# Patient Record
Sex: FEMALE | Race: OTHER | NOT HISPANIC OR LATINO | URBAN - METROPOLITAN AREA
[De-identification: names, ages, dates, MRNs, and addresses within clinical notes are randomized per-mention and may not be internally consistent; named-entity substitution may affect disease eponyms.]

---

## 2019-08-09 ENCOUNTER — EMERGENCY (EMERGENCY)
Facility: HOSPITAL | Age: 79
LOS: 1 days | Discharge: HOME | End: 2019-08-09
Attending: EMERGENCY MEDICINE
Payer: SELF-PAY

## 2019-08-09 VITALS
OXYGEN SATURATION: 100 % | DIASTOLIC BLOOD PRESSURE: 77 MMHG | HEART RATE: 87 BPM | SYSTOLIC BLOOD PRESSURE: 149 MMHG | WEIGHT: 104.94 LBS | HEIGHT: 58 IN | RESPIRATION RATE: 20 BRPM | TEMPERATURE: 98 F

## 2019-08-09 VITALS
TEMPERATURE: 99 F | DIASTOLIC BLOOD PRESSURE: 72 MMHG | RESPIRATION RATE: 20 BRPM | HEART RATE: 79 BPM | SYSTOLIC BLOOD PRESSURE: 149 MMHG

## 2019-08-09 DIAGNOSIS — D64.9 ANEMIA, UNSPECIFIED: ICD-10-CM

## 2019-08-09 DIAGNOSIS — R06.02 SHORTNESS OF BREATH: ICD-10-CM

## 2019-08-09 LAB
ALBUMIN SERPL ELPH-MCNC: 4.5 G/DL — SIGNIFICANT CHANGE UP (ref 3.3–5.2)
ALP SERPL-CCNC: 83 U/L — SIGNIFICANT CHANGE UP (ref 40–120)
ALT FLD-CCNC: 24 U/L — SIGNIFICANT CHANGE UP
ANION GAP SERPL CALC-SCNC: 13 MMOL/L — SIGNIFICANT CHANGE UP (ref 5–17)
ANISOCYTOSIS BLD QL: SLIGHT — SIGNIFICANT CHANGE UP
APTT BLD: 34 SEC — SIGNIFICANT CHANGE UP (ref 27.5–36.3)
AST SERPL-CCNC: 23 U/L — SIGNIFICANT CHANGE UP
BASOPHILS # BLD AUTO: 0.16 K/UL — SIGNIFICANT CHANGE UP (ref 0–0.2)
BASOPHILS NFR BLD AUTO: 0.9 % — SIGNIFICANT CHANGE UP (ref 0–2)
BILIRUB SERPL-MCNC: 0.3 MG/DL — LOW (ref 0.4–2)
BLD GP AB SCN SERPL QL: SIGNIFICANT CHANGE UP
BUN SERPL-MCNC: 23 MG/DL — HIGH (ref 8–20)
CALCIUM SERPL-MCNC: 9.6 MG/DL — SIGNIFICANT CHANGE UP (ref 8.6–10.2)
CHLORIDE SERPL-SCNC: 100 MMOL/L — SIGNIFICANT CHANGE UP (ref 98–107)
CO2 SERPL-SCNC: 20 MMOL/L — LOW (ref 22–29)
CREAT SERPL-MCNC: 1.03 MG/DL — SIGNIFICANT CHANGE UP (ref 0.5–1.3)
DACRYOCYTES BLD QL SMEAR: SLIGHT — SIGNIFICANT CHANGE UP
ELLIPTOCYTES BLD QL SMEAR: SLIGHT — SIGNIFICANT CHANGE UP
EOSINOPHIL # BLD AUTO: 0 K/UL — SIGNIFICANT CHANGE UP (ref 0–0.5)
EOSINOPHIL NFR BLD AUTO: 0 % — SIGNIFICANT CHANGE UP (ref 0–6)
GIANT PLATELETS BLD QL SMEAR: PRESENT — SIGNIFICANT CHANGE UP
GLUCOSE SERPL-MCNC: 158 MG/DL — HIGH (ref 70–115)
HCT VFR BLD CALC: 26.7 % — LOW (ref 34.5–45)
HGB BLD-MCNC: 8.3 G/DL — LOW (ref 11.5–15.5)
HYPOCHROMIA BLD QL: SLIGHT — SIGNIFICANT CHANGE UP
INR BLD: 1.08 RATIO — SIGNIFICANT CHANGE UP (ref 0.88–1.16)
LYMPHOCYTES # BLD AUTO: 11.5 % — LOW (ref 13–44)
LYMPHOCYTES # BLD AUTO: 2.04 K/UL — SIGNIFICANT CHANGE UP (ref 1–3.3)
MACROCYTES BLD QL: SLIGHT — SIGNIFICANT CHANGE UP
MANUAL SMEAR VERIFICATION: SIGNIFICANT CHANGE UP
MCHC RBC-ENTMCNC: 31.1 GM/DL — LOW (ref 32–36)
MCHC RBC-ENTMCNC: 32 PG — SIGNIFICANT CHANGE UP (ref 27–34)
MCV RBC AUTO: 103.1 FL — HIGH (ref 80–100)
METAMYELOCYTES # FLD: 6.2 % — HIGH (ref 0–0)
MICROCYTES BLD QL: SLIGHT — SIGNIFICANT CHANGE UP
MONOCYTES # BLD AUTO: 0.16 K/UL — SIGNIFICANT CHANGE UP (ref 0–0.9)
MONOCYTES NFR BLD AUTO: 0.9 % — LOW (ref 2–14)
MYELOCYTES NFR BLD: 8.8 % — HIGH (ref 0–0)
NEUTROPHILS # BLD AUTO: 12.54 K/UL — HIGH (ref 1.8–7.4)
NEUTROPHILS NFR BLD AUTO: 63.7 % — SIGNIFICANT CHANGE UP (ref 43–77)
NEUTS BAND # BLD: 7.1 % — SIGNIFICANT CHANGE UP (ref 0–8)
NRBC # BLD: 2 /100 — HIGH (ref 0–0)
OVALOCYTES BLD QL SMEAR: SLIGHT — SIGNIFICANT CHANGE UP
PLAT MORPH BLD: ABNORMAL
PLATELET # BLD AUTO: 277 K/UL — SIGNIFICANT CHANGE UP (ref 150–400)
PLATELET COUNT - ESTIMATE: NORMAL — SIGNIFICANT CHANGE UP
POIKILOCYTOSIS BLD QL AUTO: SLIGHT — SIGNIFICANT CHANGE UP
POLYCHROMASIA BLD QL SMEAR: SLIGHT — SIGNIFICANT CHANGE UP
POTASSIUM SERPL-MCNC: 3.8 MMOL/L — SIGNIFICANT CHANGE UP (ref 3.5–5.3)
POTASSIUM SERPL-SCNC: 3.8 MMOL/L — SIGNIFICANT CHANGE UP (ref 3.5–5.3)
PROT SERPL-MCNC: 8 G/DL — SIGNIFICANT CHANGE UP (ref 6.6–8.7)
PROTHROM AB SERPL-ACNC: 12.4 SEC — SIGNIFICANT CHANGE UP (ref 10–12.9)
RBC # BLD: 2.59 M/UL — LOW (ref 3.8–5.2)
RBC # FLD: 16 % — HIGH (ref 10.3–14.5)
RBC BLD AUTO: ABNORMAL
SCHISTOCYTES BLD QL AUTO: SLIGHT — SIGNIFICANT CHANGE UP
SODIUM SERPL-SCNC: 133 MMOL/L — LOW (ref 135–145)
VARIANT LYMPHS # BLD: 0.9 % — SIGNIFICANT CHANGE UP (ref 0–6)
WBC # BLD: 17.71 K/UL — HIGH (ref 3.8–10.5)
WBC # FLD AUTO: 17.71 K/UL — HIGH (ref 3.8–10.5)

## 2019-08-09 PROCEDURE — 80053 COMPREHEN METABOLIC PANEL: CPT

## 2019-08-09 PROCEDURE — 85730 THROMBOPLASTIN TIME PARTIAL: CPT

## 2019-08-09 PROCEDURE — 85027 COMPLETE CBC AUTOMATED: CPT

## 2019-08-09 PROCEDURE — 86923 COMPATIBILITY TEST ELECTRIC: CPT

## 2019-08-09 PROCEDURE — G0378: CPT

## 2019-08-09 PROCEDURE — 36430 TRANSFUSION BLD/BLD COMPNT: CPT

## 2019-08-09 PROCEDURE — 99218: CPT

## 2019-08-09 PROCEDURE — 99285 EMERGENCY DEPT VISIT HI MDM: CPT | Mod: 25

## 2019-08-09 PROCEDURE — 86901 BLOOD TYPING SEROLOGIC RH(D): CPT

## 2019-08-09 PROCEDURE — 36415 COLL VENOUS BLD VENIPUNCTURE: CPT

## 2019-08-09 PROCEDURE — 86850 RBC ANTIBODY SCREEN: CPT

## 2019-08-09 PROCEDURE — P9016: CPT

## 2019-08-09 PROCEDURE — 86900 BLOOD TYPING SEROLOGIC ABO: CPT

## 2019-08-09 PROCEDURE — 85610 PROTHROMBIN TIME: CPT

## 2019-08-09 RX ORDER — METFORMIN HYDROCHLORIDE 850 MG/1
1 TABLET ORAL
Qty: 0 | Refills: 0 | DISCHARGE

## 2019-08-09 NOTE — ED CDU PROVIDER INITIAL DAY NOTE - PROGRESS NOTE DETAILS
PT with symptomatic improvement, due to hx of [place] overload will hold 2nd unite follow up to hematologist, pt agrees to plan of care.

## 2019-08-09 NOTE — ED CDU PROVIDER INITIAL DAY NOTE - DETAILS
PT with known dz with symptoms similar to other incidents pt with documentation of dz, placed in obs for transfusion due to symptomatic anemia.

## 2019-08-09 NOTE — ED STATDOCS - PROGRESS NOTE DETAILS
PA NOTE: Pt seen by intake physician and hpi/ROS/PE/plan reviewed and agreed with.    PE: GEN: Awake, alert,  NAD,  EYES: PERRL CARDIAC: Reg rate and rhythm, S1,S2, RRR  RESP: No distress noted. Lungs CTA bilaterally no wheeze, ronchi, rales. ABD: soft,  non-tender, no guarding. . NEURO: AOx3, no focal deficits   PLAN: PT found to be symptotically anemic, will place in obs for transfusion after discussing plan of care with pt who agrees to plan. PA NOTE: Pt seen by intake physician and hpi/ROS/PE/plan reviewed and agreed with.    PE: GEN: Awake, alert,  NAD,  EYES: PERRL CARDIAC: Reg rate and rhythm, S1,S2, RRR  RESP: No distress noted. Lungs CTA bilaterally no wheeze, ronchi, rales. ABD: soft,  non-tender, no guarding. . NEURO: AOx3, no focal deficits   PLAN: PT found to be symptotically anemic, appears clinically well last flew on june 3rd, stable VS pt wo CP, or actively SOB,  will place in obs for transfusion after discussing plan of care with pt who agrees to plan. PT documentation of dz from [id number] in [facility] scanned into alpha.

## 2019-08-09 NOTE — ED STATDOCS - CLINICAL SUMMARY MEDICAL DECISION MAKING FREE TEXT BOX
78F with SOB. No CP. Plan for lab work and transfuse. 78F with SOB and fatigue sent in for transfusion. No CP. Plan for lab work and transfuse.

## 2019-08-09 NOTE — ED STATDOCS - OBJECTIVE STATEMENT
77 y/o female with a PMHx of HTN, DM, myeloproliferative disorder presents to the ED sent to ED by PMD- Dr. Han for blood transfusion. Pt is here from [facility] visiting family, became SOB last week, worse with exertion. Labs from yesterday showed hemoglobin of 8.1. Pt with myeloproliferative disorder- requires blood transfusion every 3-6 months. Pt will be traveling back to [place] next week. Denies vaginal bleeding, hematuria, hematochezia, melena, CP, fever. Never smoker. 79 y/o female with a PMHx of HTN, DM, myeloproliferative disorder sent to ED by PMD- Dr. Han for blood transfusion. Pt is here from [facility] visiting family, became SOB last week, worse with exertion. +fatigue. Labs from yesterday showed hemoglobin of 8.1. Pt with myeloproliferative disorder- requires blood transfusion every 3-6 months. Pt will be traveling back to [place] next week. Denies vaginal bleeding, hematuria, hematochezia, melena, CP, fever. Never smoker.

## 2019-08-09 NOTE — ED CDU PROVIDER INITIAL DAY NOTE - OBJECTIVE STATEMENT
· 77 y/o female with a PMHx of HTN, DM, myeloproliferative  disease (records in Alpha for hematologist in [facility]) sent to ED by PMD- Dr. Han for blood transfusion. Pt is here from [facility] visiting family, (last flew june3rd) became SOB last week, worse with exertion. +fatigue. Labs from yesterday showed hemoglobin of 8.1. Pt with myeloproliferative disorder- requires blood transfusion every 3-6 months. Pt will be traveling back to [place] next week. Denies vaginal bleeding, hematuria, hematochezia, melena, CP, fever. Never smoker.

## 2019-08-09 NOTE — ED CDU PROVIDER INITIAL DAY NOTE - MEDICAL DECISION MAKING DETAILS
PT with symptomatic anemia placed in obs for Therapeutic intensity. PT seen BY OBS PA found to be appropriate CDU PT care transferred to PA.

## 2019-08-09 NOTE — ED CDU PROVIDER DISPOSITION NOTE - NS_EDPROVIDERDISPOUSERTYPE_ED_A_ED
Interventional Cardiology PA SDA Discharge Note    Patient without complaints.     Afebrile, VSS    Ext:    		Right           Groin:   No   hematoma,  No   bruit, dressing; C/D/I        Pulses:    intact DP/PT to baseline     A/P:      61 y/o F with PMHx HTN, HLD, DM, CAD s/p PCI at [facility] in 2015, who presented to her cardiologist c/o chest pain that comes on suddenly primarily at night when she is at rest, associated with sweating and nausea. She also reports increasing shortness of breath with minimal exertion. Of note, she recently lost her mother and reports high stress levels. EKG in MD office shows sinus rhythm with nonspecific ST changes.  In light of patient’s risk factors, CCS Class IV angina symptoms, and known CAD, patient warecommended for cardiac catheterization with possible intervention if clinically indicated. Underwent cardiac cath on 2/27/18 which revealed nonobstructive CAD (30% mLAD, patent stent RCA) with normal LV function, EF 60%, LVEDP 9, right groin vascade.             1.	Stable for discharge as per attending Dr. Bailey after bed rest, pt voids, groin stable and 30 minutes of ambulation.  2.	Follow-up with Cardiologist Dr. Perez in 1-2 weeks  3.	Discharged forms signed and copies in chart
Attending Attestation (For Attendings USE Only)...

## 2019-08-09 NOTE — ED CDU PROVIDER INITIAL DAY NOTE - ATTENDING CONTRIBUTION TO CARE
seen with acp: well appearing adult female traveling from [facility]; with DAMON reportedly due to symptomatic anemia; Patient has records from [facility], well documented history from hematologist describing myeloproliferative disease, need for frequent transfusions, and desire to keep Hb above 9; will admit to obs for transfusion 1 unit

## 2019-08-09 NOTE — ED STATDOCS - ATTENDING CONTRIBUTION TO CARE
Tierra: I performed a face to face bedside interview with patient regarding history of present illness, review of symptoms and past medical history. I completed an independent physical exam and ordered tests/medications as needed.  I have discussed patient's plan of care with advanced care provider. The advanced care provider assisted in  executing the discussed plan. I was available for any questions or issues that may have arose during the execution of the plan of care.

## 2019-08-09 NOTE — ED CDU PROVIDER DISPOSITION NOTE - CLINICAL COURSE
77 y/o female with a PMHx of HTN, DM, myeloproliferative  disease (records in Alpha for hematologist in [facility]) sent to ED by PMD- Dr. Han for blood transfusion. Pt is here from [facility] visiting family, (last flew june3rd) became SOB last week, worse with exertion. found to be anemic in PCP office confirmed in ED, PT with symptomatic improvement after one unit, due to hx of [place] overload will hold 2nd unite follow up to hematologist, pt agrees to plan of care.  Pt will be traveling back to [place] next week. educated about when to return to the ED if needed. PT verbalizes that he understands all instructions and results. Pt educated about the risks vs benefits of imaging at this time and agrees that not warranted for their symptoms, and PE

## 2019-08-09 NOTE — ED ADULT NURSE NOTE - OBJECTIVE STATEMENT
sent from daughter's PMD Dr. Lopez for hgb of 8.1. pt reports feeling increased weakness and tiredness as well as SOB x 1 week. visiting from [facility].

## 2019-08-09 NOTE — ED STATDOCS - PHYSICAL EXAMINATION
Gen: Well appearing in NAD  Head: NC/AT  Neck: trachea midline  Resp:  No distress, CTAB  CV: RRR  Ext: no deformities  Neuro:  A&O appears non focal  Skin:  Warm and dry as visualized  Psych:  Normal affect and mood

## 2020-09-21 NOTE — ED STATDOCS - RESPIRATORY [+], MLM
CC:  Moy Perez is here today for   Chief Complaint   Patient presents with   • Office Visit     Left wrist pain \"Woke one day last month and had pain wearing  brace all the time \"   • Wrist      .    Referring MD ER  PCP Antione Darby MD    Medications: medications verified and updated  Refills needed today?  NO  denies known Latex allergy or symptoms of Latex sensitivity.  Patient would like communication of their results via:      Cell Phone:   Telephone Information:   Mobile [phone]     Okay to leave a message containing results? Yes  Tobacco history: verified                 SHORTNESS OF BREATH

## 2021-07-09 NOTE — ED ADULT NURSE NOTE - CHPI ED NUR SYMPTOMS POS
Spoke with wife, Makenzie and states out to eat right now.  Call rescheduled for next week.    Bessy Landaverde RN, BSN, [facility]  Nurse Navigator  Phone: [phone]      
WEAKNESS/SHORTNESS OF BREATH

## 2022-08-22 ENCOUNTER — INPATIENT (INPATIENT)
Facility: HOSPITAL | Age: 82
LOS: 0 days | Discharge: HOME | DRG: 309 | End: 2022-08-23
Attending: HOSPITALIST | Admitting: INTERNAL MEDICINE
Payer: MEDICAID

## 2022-08-22 VITALS
TEMPERATURE: 98 F | OXYGEN SATURATION: 94 % | DIASTOLIC BLOOD PRESSURE: 76 MMHG | HEIGHT: 58 IN | SYSTOLIC BLOOD PRESSURE: 111 MMHG | HEART RATE: 187 BPM | RESPIRATION RATE: 20 BRPM | WEIGHT: 95.02 LBS

## 2022-08-22 DIAGNOSIS — E11.9 TYPE 2 DIABETES MELLITUS WITHOUT COMPLICATIONS: ICD-10-CM

## 2022-08-22 DIAGNOSIS — Z02.9 ENCOUNTER FOR ADMINISTRATIVE EXAMINATIONS, UNSPECIFIED: ICD-10-CM

## 2022-08-22 DIAGNOSIS — D47.1 CHRONIC MYELOPROLIFERATIVE DISEASE: ICD-10-CM

## 2022-08-22 DIAGNOSIS — I48.91 UNSPECIFIED ATRIAL FIBRILLATION: ICD-10-CM

## 2022-08-22 DIAGNOSIS — E87.1 HYPO-OSMOLALITY AND HYPONATREMIA: ICD-10-CM

## 2022-08-22 DIAGNOSIS — Z90.710 ACQUIRED ABSENCE OF BOTH CERVIX AND UTERUS: Chronic | ICD-10-CM

## 2022-08-22 DIAGNOSIS — I50.9 HEART FAILURE, UNSPECIFIED: ICD-10-CM

## 2022-08-22 DIAGNOSIS — E83.119 HEMOCHROMATOSIS, UNSPECIFIED: ICD-10-CM

## 2022-08-22 DIAGNOSIS — I10 ESSENTIAL (PRIMARY) HYPERTENSION: ICD-10-CM

## 2022-08-22 DIAGNOSIS — N18.30 CHRONIC KIDNEY DISEASE, STAGE 3 UNSPECIFIED: ICD-10-CM

## 2022-08-22 LAB
ALBUMIN SERPL ELPH-MCNC: 3.5 G/DL — SIGNIFICANT CHANGE UP (ref 3.3–5.2)
ALP SERPL-CCNC: 194 U/L — HIGH (ref 40–120)
ALT FLD-CCNC: 43 U/L — HIGH
ANION GAP SERPL CALC-SCNC: 13 MMOL/L — SIGNIFICANT CHANGE UP (ref 5–17)
ANISOCYTOSIS BLD QL: SLIGHT — SIGNIFICANT CHANGE UP
APPEARANCE UR: CLEAR — SIGNIFICANT CHANGE UP
APTT BLD: 35.6 SEC — HIGH (ref 27.5–35.5)
AST SERPL-CCNC: 44 U/L — HIGH
BACTERIA # UR AUTO: ABNORMAL
BASOPHILS # BLD AUTO: 0.45 K/UL — HIGH (ref 0–0.2)
BASOPHILS NFR BLD AUTO: 1.8 % — SIGNIFICANT CHANGE UP (ref 0–2)
BILIRUB SERPL-MCNC: 1.5 MG/DL — SIGNIFICANT CHANGE UP (ref 0.4–2)
BILIRUB UR-MCNC: NEGATIVE — SIGNIFICANT CHANGE UP
BUN SERPL-MCNC: 35.6 MG/DL — HIGH (ref 8–20)
CALCIUM SERPL-MCNC: 9.3 MG/DL — SIGNIFICANT CHANGE UP (ref 8.4–10.5)
CHLORIDE SERPL-SCNC: 88 MMOL/L — LOW (ref 98–107)
CO2 SERPL-SCNC: 24 MMOL/L — SIGNIFICANT CHANGE UP (ref 22–29)
COLOR SPEC: YELLOW — SIGNIFICANT CHANGE UP
CREAT SERPL-MCNC: 1.73 MG/DL — HIGH (ref 0.5–1.3)
DIFF PNL FLD: NEGATIVE — SIGNIFICANT CHANGE UP
EGFR: 29 ML/MIN/1.73M2 — LOW
EOSINOPHIL # BLD AUTO: 1.33 K/UL — HIGH (ref 0–0.5)
EOSINOPHIL NFR BLD AUTO: 5.3 % — SIGNIFICANT CHANGE UP (ref 0–6)
EPI CELLS # UR: SIGNIFICANT CHANGE UP
GLUCOSE SERPL-MCNC: 226 MG/DL — HIGH (ref 70–99)
GLUCOSE UR QL: NEGATIVE MG/DL — SIGNIFICANT CHANGE UP
HCT VFR BLD CALC: 32.9 % — LOW (ref 34.5–45)
HGB BLD-MCNC: 10.7 G/DL — LOW (ref 11.5–15.5)
INR BLD: 1.13 RATIO — SIGNIFICANT CHANGE UP (ref 0.88–1.16)
KETONES UR-MCNC: NEGATIVE — SIGNIFICANT CHANGE UP
LEUKOCYTE ESTERASE UR-ACNC: ABNORMAL
LYMPHOCYTES # BLD AUTO: 1.11 K/UL — SIGNIFICANT CHANGE UP (ref 1–3.3)
LYMPHOCYTES # BLD AUTO: 4.4 % — LOW (ref 13–44)
MACROCYTES BLD QL: SLIGHT — SIGNIFICANT CHANGE UP
MAGNESIUM SERPL-MCNC: 1.7 MG/DL — SIGNIFICANT CHANGE UP (ref 1.6–2.6)
MANUAL SMEAR VERIFICATION: SIGNIFICANT CHANGE UP
MCHC RBC-ENTMCNC: 32.5 GM/DL — SIGNIFICANT CHANGE UP (ref 32–36)
MCHC RBC-ENTMCNC: 32.8 PG — SIGNIFICANT CHANGE UP (ref 27–34)
MCV RBC AUTO: 100.9 FL — HIGH (ref 80–100)
MONOCYTES # BLD AUTO: 2.21 K/UL — HIGH (ref 0–0.9)
MONOCYTES NFR BLD AUTO: 8.8 % — SIGNIFICANT CHANGE UP (ref 2–14)
MYELOCYTES NFR BLD: 0.9 % — HIGH (ref 0–0)
NEUTROPHILS # BLD AUTO: 18.89 K/UL — HIGH (ref 1.8–7.4)
NEUTROPHILS NFR BLD AUTO: 75.2 % — SIGNIFICANT CHANGE UP (ref 43–77)
NITRITE UR-MCNC: NEGATIVE — SIGNIFICANT CHANGE UP
NT-PROBNP SERPL-SCNC: 3859 PG/ML — HIGH (ref 0–300)
PH UR: 6 — SIGNIFICANT CHANGE UP (ref 5–8)
PLAT MORPH BLD: NORMAL — SIGNIFICANT CHANGE UP
PLATELET # BLD AUTO: 176 K/UL — SIGNIFICANT CHANGE UP (ref 150–400)
POIKILOCYTOSIS BLD QL AUTO: SLIGHT — SIGNIFICANT CHANGE UP
POLYCHROMASIA BLD QL SMEAR: SIGNIFICANT CHANGE UP
POTASSIUM SERPL-MCNC: 4.1 MMOL/L — SIGNIFICANT CHANGE UP (ref 3.5–5.3)
POTASSIUM SERPL-SCNC: 4.1 MMOL/L — SIGNIFICANT CHANGE UP (ref 3.5–5.3)
PROMYELOCYTES # FLD: 2.7 % — HIGH (ref 0–0)
PROT SERPL-MCNC: 7.4 G/DL — SIGNIFICANT CHANGE UP (ref 6.6–8.7)
PROT UR-MCNC: 30 MG/DL
PROTHROM AB SERPL-ACNC: 13.1 SEC — SIGNIFICANT CHANGE UP (ref 10.5–13.4)
RAPID RVP RESULT: SIGNIFICANT CHANGE UP
RBC # BLD: 3.26 M/UL — LOW (ref 3.8–5.2)
RBC # FLD: 16.4 % — HIGH (ref 10.3–14.5)
RBC BLD AUTO: ABNORMAL
RBC CASTS # UR COMP ASSIST: SIGNIFICANT CHANGE UP /HPF (ref 0–4)
SARS-COV-2 RNA SPEC QL NAA+PROBE: SIGNIFICANT CHANGE UP
SCHISTOCYTES BLD QL AUTO: SLIGHT — SIGNIFICANT CHANGE UP
SMUDGE CELLS # BLD: PRESENT — SIGNIFICANT CHANGE UP
SODIUM SERPL-SCNC: 125 MMOL/L — LOW (ref 135–145)
SP GR SPEC: 1.01 — SIGNIFICANT CHANGE UP (ref 1.01–1.02)
TROPONIN T SERPL-MCNC: <0.01 NG/ML — SIGNIFICANT CHANGE UP (ref 0–0.06)
UROBILINOGEN FLD QL: NEGATIVE MG/DL — SIGNIFICANT CHANGE UP
VARIANT LYMPHS # BLD: 0.9 % — SIGNIFICANT CHANGE UP (ref 0–6)
WBC # BLD: 25.12 K/UL — HIGH (ref 3.8–10.5)
WBC # FLD AUTO: 25.12 K/UL — HIGH (ref 3.8–10.5)
WBC UR QL: SIGNIFICANT CHANGE UP /HPF (ref 0–5)

## 2022-08-22 PROCEDURE — 93306 TTE W/DOPPLER COMPLETE: CPT | Mod: 26

## 2022-08-22 PROCEDURE — 99223 1ST HOSP IP/OBS HIGH 75: CPT

## 2022-08-22 PROCEDURE — 99285 EMERGENCY DEPT VISIT HI MDM: CPT

## 2022-08-22 PROCEDURE — 71045 X-RAY EXAM CHEST 1 VIEW: CPT | Mod: 26

## 2022-08-22 PROCEDURE — 93010 ELECTROCARDIOGRAM REPORT: CPT | Mod: 76

## 2022-08-22 RX ORDER — DEFERASIROX 180 MG/1
960 GRANULE ORAL
Qty: 0 | Refills: 0 | DISCHARGE

## 2022-08-22 RX ORDER — SODIUM CHLORIDE 9 MG/ML
250 INJECTION INTRAMUSCULAR; INTRAVENOUS; SUBCUTANEOUS ONCE
Refills: 0 | Status: COMPLETED | OUTPATIENT
Start: 2022-08-22 | End: 2022-08-22

## 2022-08-22 RX ORDER — FOLIC ACID 0.8 MG
1 TABLET ORAL
Qty: 0 | Refills: 0 | DISCHARGE

## 2022-08-22 RX ORDER — ASPIRIN/CALCIUM CARB/MAGNESIUM 324 MG
100 TABLET ORAL
Qty: 0 | Refills: 0 | DISCHARGE

## 2022-08-22 RX ORDER — HEPARIN SODIUM 5000 [USP'U]/ML
INJECTION INTRAVENOUS; SUBCUTANEOUS
Qty: 25000 | Refills: 0 | Status: DISCONTINUED | OUTPATIENT
Start: 2022-08-22 | End: 2022-08-22

## 2022-08-22 RX ORDER — ATORVASTATIN CALCIUM 80 MG/1
40 TABLET, FILM COATED ORAL AT BEDTIME
Refills: 0 | Status: DISCONTINUED | OUTPATIENT
Start: 2022-08-22 | End: 2022-08-23

## 2022-08-22 RX ORDER — HEPARIN SODIUM 5000 [USP'U]/ML
1500 INJECTION INTRAVENOUS; SUBCUTANEOUS EVERY 6 HOURS
Refills: 0 | Status: DISCONTINUED | OUTPATIENT
Start: 2022-08-22 | End: 2022-08-22

## 2022-08-22 RX ORDER — OMEGA-3 ACID ETHYL ESTERS 1 G
1 CAPSULE ORAL
Qty: 0 | Refills: 0 | DISCHARGE

## 2022-08-22 RX ORDER — ALLOPURINOL 300 MG
1 TABLET ORAL
Qty: 0 | Refills: 0 | DISCHARGE

## 2022-08-22 RX ORDER — ANAGRELIDE HCL 0.5 MG
0.5 CAPSULE ORAL
Qty: 0 | Refills: 0 | DISCHARGE

## 2022-08-22 RX ORDER — SODIUM CHLORIDE 9 MG/ML
1 INJECTION INTRAMUSCULAR; INTRAVENOUS; SUBCUTANEOUS
Refills: 0 | Status: DISCONTINUED | OUTPATIENT
Start: 2022-08-22 | End: 2022-08-23

## 2022-08-22 RX ORDER — LANOLIN ALCOHOL/MO/W.PET/CERES
3 CREAM (GRAM) TOPICAL AT BEDTIME
Refills: 0 | Status: DISCONTINUED | OUTPATIENT
Start: 2022-08-22 | End: 2022-08-23

## 2022-08-22 RX ORDER — SODIUM CHLORIDE 9 MG/ML
1 INJECTION INTRAMUSCULAR; INTRAVENOUS; SUBCUTANEOUS
Qty: 0 | Refills: 0 | DISCHARGE

## 2022-08-22 RX ORDER — ROSUVASTATIN CALCIUM 5 MG/1
1 TABLET ORAL
Qty: 0 | Refills: 0 | DISCHARGE

## 2022-08-22 RX ORDER — DILTIAZEM HCL 120 MG
10 CAPSULE, EXT RELEASE 24 HR ORAL ONCE
Refills: 0 | Status: COMPLETED | OUTPATIENT
Start: 2022-08-22 | End: 2022-08-22

## 2022-08-22 RX ORDER — DILTIAZEM HCL 120 MG
30 CAPSULE, EXT RELEASE 24 HR ORAL ONCE
Refills: 0 | Status: COMPLETED | OUTPATIENT
Start: 2022-08-22 | End: 2022-08-22

## 2022-08-22 RX ORDER — LOSARTAN POTASSIUM 100 MG/1
100 TABLET, FILM COATED ORAL DAILY
Refills: 0 | Status: DISCONTINUED | OUTPATIENT
Start: 2022-08-22 | End: 2022-08-23

## 2022-08-22 RX ORDER — AMLODIPINE BESYLATE 2.5 MG/1
1 TABLET ORAL
Qty: 0 | Refills: 0 | DISCHARGE

## 2022-08-22 RX ORDER — SODIUM CHLORIDE 9 MG/ML
500 INJECTION, SOLUTION INTRAVENOUS ONCE
Refills: 0 | Status: COMPLETED | OUTPATIENT
Start: 2022-08-22 | End: 2022-08-22

## 2022-08-22 RX ORDER — ALLOPURINOL 300 MG
100 TABLET ORAL DAILY
Refills: 0 | Status: DISCONTINUED | OUTPATIENT
Start: 2022-08-22 | End: 2022-08-23

## 2022-08-22 RX ORDER — APIXABAN 2.5 MG/1
2.5 TABLET, FILM COATED ORAL
Refills: 0 | Status: DISCONTINUED | OUTPATIENT
Start: 2022-08-22 | End: 2022-08-23

## 2022-08-22 RX ORDER — ACETAMINOPHEN 500 MG
650 TABLET ORAL EVERY 6 HOURS
Refills: 0 | Status: DISCONTINUED | OUTPATIENT
Start: 2022-08-22 | End: 2022-08-23

## 2022-08-22 RX ORDER — HEPARIN SODIUM 5000 [USP'U]/ML
3500 INJECTION INTRAVENOUS; SUBCUTANEOUS EVERY 6 HOURS
Refills: 0 | Status: DISCONTINUED | OUTPATIENT
Start: 2022-08-22 | End: 2022-08-22

## 2022-08-22 RX ORDER — HEPARIN SODIUM 5000 [USP'U]/ML
3500 INJECTION INTRAVENOUS; SUBCUTANEOUS ONCE
Refills: 0 | Status: COMPLETED | OUTPATIENT
Start: 2022-08-22 | End: 2022-08-22

## 2022-08-22 RX ORDER — FOLIC ACID 0.8 MG
1 TABLET ORAL DAILY
Refills: 0 | Status: DISCONTINUED | OUTPATIENT
Start: 2022-08-22 | End: 2022-08-23

## 2022-08-22 RX ORDER — OMEPRAZOLE 10 MG/1
1 CAPSULE, DELAYED RELEASE ORAL
Qty: 0 | Refills: 0 | DISCHARGE

## 2022-08-22 RX ORDER — FUROSEMIDE 40 MG
1 TABLET ORAL
Qty: 0 | Refills: 0 | DISCHARGE

## 2022-08-22 RX ORDER — METOPROLOL TARTRATE 50 MG
25 TABLET ORAL
Refills: 0 | Status: DISCONTINUED | OUTPATIENT
Start: 2022-08-22 | End: 2022-08-23

## 2022-08-22 RX ADMIN — SODIUM CHLORIDE 250 MILLILITER(S): 9 INJECTION INTRAMUSCULAR; INTRAVENOUS; SUBCUTANEOUS at 05:53

## 2022-08-22 RX ADMIN — Medication 100 MILLIGRAM(S): at 17:57

## 2022-08-22 RX ADMIN — HEPARIN SODIUM 3500 UNIT(S): 5000 INJECTION INTRAVENOUS; SUBCUTANEOUS at 06:40

## 2022-08-22 RX ADMIN — Medication 10 MILLIGRAM(S): at 05:39

## 2022-08-22 RX ADMIN — Medication 30 MILLIGRAM(S): at 05:47

## 2022-08-22 RX ADMIN — SODIUM CHLORIDE 1 GRAM(S): 9 INJECTION INTRAMUSCULAR; INTRAVENOUS; SUBCUTANEOUS at 17:57

## 2022-08-22 RX ADMIN — HEPARIN SODIUM 800 UNIT(S)/HR: 5000 INJECTION INTRAVENOUS; SUBCUTANEOUS at 06:37

## 2022-08-22 RX ADMIN — APIXABAN 2.5 MILLIGRAM(S): 2.5 TABLET, FILM COATED ORAL at 17:57

## 2022-08-22 RX ADMIN — Medication 1 MILLIGRAM(S): at 17:57

## 2022-08-22 RX ADMIN — Medication 25 MILLIGRAM(S): at 17:57

## 2022-08-22 RX ADMIN — SODIUM CHLORIDE 500 MILLILITER(S): 9 INJECTION, SOLUTION INTRAVENOUS at 09:08

## 2022-08-22 NOTE — H&P ADULT - PROBLEM SELECTOR PLAN 6
As per daughter current CBC including WBC is at baseline.  With this information low concern for underlying infection.

## 2022-08-22 NOTE — H&P ADULT - PROBLEM SELECTOR PLAN 4
As per daughter current sodium is at baseline, Will continue salt tablets. As per daughter current sodium is at baseline, Will continue salt tablets.  Hold Lasix as patient seems volume depleted.

## 2022-08-22 NOTE — CONSULT NOTE ADULT - SUBJECTIVE AND OBJECTIVE BOX
[facility]                                              CARDIOLOGY AT [place] [address]                                             Telephone: [phone]. Fax:[phone]                                                       CARDIOLOGY CONSULTATION NOTE                                                                                             History obtained by: Patient and medical record  Community Cardiologist: None   obtained: Yes [  ] No [  ]  Reason for Consultation: Palpitations  Available out pt records reviewed: Yes [X] No [  ]    Chief complaint:  I have fast heart beat     HPI: Patient is an 80yo F w/ PMHx of MDS, Non ischemic CM,      CARDIAC TESTING   ECHO: Pen    STRESS:    CATH:     ELECTROPHYSIOLOGY:     PAST MEDICAL HISTORY  HTN (hypertension)  DM (diabetes mellitus)  Myeloproliferative disorder    PAST SURGICAL HISTORY    SOCIAL HISTORY:  Denies smoking/alcohol/drugs    FAMILY HISTORY: None reported    Family History of Cardiovascular Disease:  Yes [X] No [  ]  Coronary Artery Disease in first degree relative: Yes [  ] No [  ]  Sudden Cardiac Death in First degree relative: Yes [  ] No [  ]    HOME MEDICATIONS:  anagrelide 0.5 mg oral capsule: 0.5 milligram(s) orally 3 times a day (09 Aug 2019 08:22)  aspirin 162 mg oral delayed release tablet: 100 milligram(s) orally once a day (09 Aug 2019 08:22)  Fish Oil 1000 mg oral capsule: 1 cap(s) orally once a day (09 Aug 2019 08:22)  metFORMIN 500 mg oral tablet, extended release: 1 tab(s) orally once a day (09 Aug 2019 08:22)  Norvasc 5 mg oral tablet: 1 tab(s) orally once a day (09 Aug 2019 08:22)  omeprazole 40 mg oral delayed release capsule: 1 cap(s) orally once a day (09 Aug 2019 08:22)    CURRENT CARDIAC MEDICATIONS:    CURRENT OTHER MEDICATIONS:  heparin   Injectable 3500 Unit(s) IV Push once, Stop order after: 1 Doses  heparin   Injectable 3500 Unit(s) IV Push every 6 hours PRN For aPTT less than 40  heparin   Injectable 1500 Unit(s) IV Push every 6 hours PRN For aPTT between 40 - 57  heparin  Infusion.  Unit(s)/Hr (8 mL/Hr) IV Continuous <Continuous>    ALLERGIES: Bactrim (Urticaria)    REVIEW OF SYMPTOMS:   CONSTITUTIONAL: No fever, no chills, no weight loss, no weight gain, no fatigue   ENMT:  No vertigo; No sinus or throat pain  NECK: No pain or stiffness  CARDIOVASCULAR: + chest pain, + dyspnea, no syncope/presyncope, + palpitations, no dizziness, no Orthopnea, no Paroxsymal nocturnal dyspnea  RESPIRATORY: no Shortness of breath, no cough, no wheezing  : No dysuria, no hematuria   GI: No dark color stool, no nausea, no diarrhea, no constipation, no abdominal pain   NEURO: No headache, no slurred speech   MUSCULOSKELETAL: No joint pain or swelling; No muscle, back, or extremity pain  PSYCH: No agitation, no anxiety    ALL OTHER REVIEW OF SYSTEMS ARE NEGATIVE.    VITAL SIGNS:  T(C): 36.7 (08-22-22 @ 04:49), Max: 36.7 (08-22-22 @ 04:49)  T(F): 98.1 (08-22-22 @ 04:49), Max: 98.1 (08-22-22 @ 04:49)  HR: 121 (08-22-22 @ 05:48) (121 - 187)  BP: 96/53 (08-22-22 @ 05:48) (96/53 - 116/59)  RR: 18 (08-22-22 @ 05:48) (18 - 20)  SpO2: 94% (08-22-22 @ 05:48) (94% - 94%)    INTAKE AND OUTPUT:     PHYSICAL EXAM:  Constitutional: Comfortable, no acute distress   HEENT: Atraumatic and normocephalic, neck is supple, no JVD  CNS: A&Ox3, No focal deficits   Respiratory: + fine crackles at the bases B/L, no wheezing, unlabored   Cardiovascular: + tachycardic, Irreg/irreg, S1S2, No murmur  Gastrointestinal: Soft, non-tender +Bowel sounds.   Extremities: 2+ Peripheral Pulses, No clubbing, cyanosis, or edema  Psychiatric: Calm, no agitation   Skin: Warm and dry, no ulcers on extremities     LABS: Pen    INTERPRETATION OF TELEMETRY: A fib RVR  ECG: A fib w/ RVR non ischemic   Prior ECG: Yes [X] No [  ]    RADIOLOGY & ADDITIONAL STUDIES:   X-ray:  Pen                                              [facility]                                              CARDIOLOGY AT [address], [place] [address]                                             Telephone: [phone]. Fax:[phone]                                                       CARDIOLOGY CONSULTATION NOTE                                                                                             History obtained by: Patient and medical record  Community Cardiologist: None   obtained: Yes [  ] No [X]  Reason for Consultation: Palpitations  Available out pt records reviewed: Yes [X] No [  ]    Chief complaint:  I have fast heart beat     HPI: Patient is an 80yo F w/ PMHx of MDS, Non ischemic CM, HTN, T2DM, presenting with palpitations that started suddenly yesterday.  Patient had some atypical chest pressure and dyspnea prior to her palpitations and daughter took her to Urgent Care for evaluation.  No acute findings at the time and ECG w/ NSR on 8/21.  This morning patient's daughter checked mom's pulse and it was rapid with irregular rhythm.  In the EDU HR in 150-170 and irregular with no history of prior A-fib reported.  Patient hemodynamically stable and denies CP, fever, chills, N/V, headache, cough, diarrhea, hematuria, dysuria, dark stools, focal neurologic symptoms, slurred speech or leg weakness.    CARDIAC TESTING   ECHO: Pen  STRESS:  CATH:   ELECTROPHYSIOLOGY:     PAST MEDICAL HISTORY  HTN (hypertension)  DM (diabetes mellitus)  Myeloproliferative disorder    PAST SURGICAL HISTORY    SOCIAL HISTORY:  Denies smoking/alcohol/drugs    FAMILY HISTORY: None reported    Family History of Cardiovascular Disease:  Yes [X] No [  ]  Coronary Artery Disease in first degree relative: Yes [  ] No [  ]  Sudden Cardiac Death in First degree relative: Yes [  ] No [  ]    HOME MEDICATIONS:  anagrelide 0.5 mg oral capsule: 0.5 milligram(s) orally 3 times a day (09 Aug 2019 08:22)  aspirin 162 mg oral delayed release tablet: 100 milligram(s) orally once a day (09 Aug 2019 08:22)  Fish Oil 1000 mg oral capsule: 1 cap(s) orally once a day (09 Aug 2019 08:22)  metFORMIN 500 mg oral tablet, extended release: 1 tab(s) orally once a day (09 Aug 2019 08:22)  Norvasc 5 mg oral tablet: 1 tab(s) orally once a day (09 Aug 2019 08:22)  omeprazole 40 mg oral delayed release capsule: 1 cap(s) orally once a day (09 Aug 2019 08:22)    CURRENT CARDIAC MEDICATIONS:    CURRENT OTHER MEDICATIONS:  heparin   Injectable 3500 Unit(s) IV Push once, Stop order after: 1 Doses  heparin   Injectable 3500 Unit(s) IV Push every 6 hours PRN For aPTT less than 40  heparin   Injectable 1500 Unit(s) IV Push every 6 hours PRN For aPTT between 40 - 57  heparin  Infusion.  Unit(s)/Hr (8 mL/Hr) IV Continuous <Continuous>    ALLERGIES: Bactrim (Urticaria)    REVIEW OF SYMPTOMS:   CONSTITUTIONAL: No fever, no chills, no weight loss, no weight gain, no fatigue   ENMT:  No vertigo; No sinus or throat pain  NECK: No pain or stiffness  CARDIOVASCULAR: + chest pain, + dyspnea, no syncope/presyncope, + palpitations, no dizziness, no Orthopnea, no Paroxsymal nocturnal dyspnea  RESPIRATORY: no Shortness of breath, no cough, no wheezing  : No dysuria, no hematuria   GI: No dark color stool, no nausea, no diarrhea, no constipation, no abdominal pain   NEURO: No headache, no slurred speech   MUSCULOSKELETAL: No joint pain or swelling; No muscle, back, or extremity pain  PSYCH: No agitation, no anxiety    ALL OTHER REVIEW OF SYSTEMS ARE NEGATIVE.    VITAL SIGNS:  T(C): 36.7 (08-22-22 @ 04:49), Max: 36.7 (08-22-22 @ 04:49)  T(F): 98.1 (08-22-22 @ 04:49), Max: 98.1 (08-22-22 @ 04:49)  HR: 121 (08-22-22 @ 05:48) (121 - 187)  BP: 96/53 (08-22-22 @ 05:48) (96/53 - 116/59)  RR: 18 (08-22-22 @ 05:48) (18 - 20)  SpO2: 94% (08-22-22 @ 05:48) (94% - 94%)    INTAKE AND OUTPUT:     PHYSICAL EXAM:  Constitutional: Comfortable, no acute distress   HEENT: Atraumatic and normocephalic, neck is supple, no JVD  CNS: A&Ox3, No focal deficits   Respiratory: + fine crackles at the bases B/L, no wheezing, unlabored   Cardiovascular: + tachycardic, Irreg/irreg, S1S2, No murmur  Gastrointestinal: Soft, non-tender +Bowel sounds.   Extremities: 2+ Peripheral Pulses, No clubbing, cyanosis, or edema  Psychiatric: Calm, no agitation   Skin: Warm and dry, no ulcers on extremities     LABS: Pen    INTERPRETATION OF TELEMETRY: A fib RVR  ECG: A fib w/ RVR non ischemic   Prior ECG: Yes [X] No [  ]    RADIOLOGY & ADDITIONAL STUDIES:   X-ray:  Pen

## 2022-08-22 NOTE — H&P ADULT - VTE RISK ASSESSMENT
Shayla Rogers is a 85 year old female presenting for   Chief Complaint   Patient presents with   • Office Visit   • Neurologic Problem     Lacunar infarct, acute     Denies Latex allergy or sensitivity.      Pt is here for a consultation from Dr. Barber for Lacunar Infarct, acute. Consult with Dr. Rodriguez in ED on 12/26/2020.    Pt presents with daughter, Jocelyn. Pt ambulates minimally d/t injury. Pt is currently in wheel chair. Patient currently working with PT. Completed ST. Pt resides with daughter.     Schedule with Cardio on 4/2/2021.    Current Meds:  ASA 81 mg QD  Lipitor 80 mg QD  Celexa 10 mg QD      Images:  Us Carotid 12/27/2020  MRI Brain 12/27/2020  CT Head 12/26/2020  Cardiac Monitor 1/28/2021           <<--- Click to launch

## 2022-08-22 NOTE — H&P ADULT - HISTORY OF PRESENT ILLNESS
81 yr old F w/a PMH of MDS, Hemochromatosis, Non ischemic CM, HTN, gout, DM presents new onset of Afib.  Patient is visiting from [place] since May.  Today patient had a flight to go back when the daughter (nurse) took her vital signs and saw that the patient with tachycardiac to 170.  Overall patient states she has been feeling in normal health, just does describe an increase in fatigue.  A few weeks ago they did go to the urgent care for chest pain and the EKG at that time was negative.

## 2022-08-22 NOTE — ED PROVIDER NOTE - OBJECTIVE STATEMENT
81y Female with history of CHF, HTN, DM, myeloproliferative disorder presenting with palpitations with no chest pain, shortness of breath, nausea, abdominal pain. Patient reports chest pain yesterday. Denies history of afib. No recent med change. Denies fevers, chills, headache, cough, nausea, vomiting, diarrhea, hematuria, dysuria, dark stools, focal neurologic symptoms.

## 2022-08-22 NOTE — H&P ADULT - NEUROLOGICAL
Face to face report given with opportunity to observe patient.    Report given to Linn Casiano RN   10/19/2020  11:09 PM       negative normal/cranial nerves II-XII intact/sensation intact

## 2022-08-22 NOTE — CONSULT NOTE ADULT - PROBLEM SELECTOR RECOMMENDATION 2
Resume home Norvasc for now, patient normotensive and asymptomatic  - low Na diet, daily minimal exercise encouraged, weight management  - VS as per unit protocol  - TTE in AM

## 2022-08-22 NOTE — H&P ADULT - NSHPLABSRESULTS_GEN_ALL_CORE
LABS:                         10.7   25.12 )-----------( 176      ( 22 Aug 2022 05:18 )             32.9         125<L>  |  88<L>  |  35.6<H>  ----------------------------<  226<H>  4.1   |  24.0  |  1.73<H>    Ca    9.3      22 Aug 2022 05:18  Mg     1.7         TPro  7.4  /  Alb  3.5  /  TBili  1.5  /  DBili  x   /  AST  44<H>  /  ALT  43<H>  /  AlkPhos  194<H>      PT/INR - ( 22 Aug 2022 05:18 )   PT: 13.1 sec;   INR: 1.13 ratio         PTT - ( 22 Aug 2022 05:18 )  PTT:35.6 sec  Urinalysis Basic - ( 22 Aug 2022 08:46 )    Color: Yellow / Appearance: Clear / S.010 / pH: x  Gluc: x / Ketone: Negative  / Bili: Negative / Urobili: Negative mg/dL   Blood: x / Protein: 30 mg/dL / Nitrite: Negative   Leuk Esterase: Small / RBC: 0-2 /HPF / WBC 0-2 /HPF   Sq Epi: x / Non Sq Epi: Occasional / Bacteria: Occasional      CARDIAC MARKERS ( 22 Aug 2022 05:18 )  x     / <0.01 ng/mL / x     / x     / x          Serum Pro-Brain Natriuretic Peptide: 3859 pg/mL ( @ 05:18)      Records reviewed from prior hospitalization.  Labs reviewed remarkable for   EKG personally reviewed   CXR personally reviewed

## 2022-08-22 NOTE — ED ADULT NURSE NOTE - NSIMPLEMENTINTERV_GEN_ALL_ED
Implemented All Universal Safety Interventions:  [place] to call system. Call bell, personal items and telephone within reach. Instruct patient to call for assistance. Room bathroom lighting operational. Non-slip footwear when patient is off stretcher. Physically safe environment: no spills, clutter or unnecessary equipment. Stretcher in lowest position, wheels locked, appropriate side rails in place.

## 2022-08-22 NOTE — H&P ADULT - PROBLEM SELECTOR PLAN 3
As above echocardiogram pending,  As per outside echocardiogram shows infiltrative disease with iron deposits

## 2022-08-22 NOTE — ED ADULT NURSE NOTE - OBJECTIVE STATEMENT
Pt is an 81YOF who is here for a fast heart rate with palpitations, pt was awake and attempting to get ready for her flight, pts daughter julio states that she felt her pulse and it was fast, so they came to the Emergency Department, pt states she is not having labored breathing, but she says she feels like at times she finds it difficult to breathe, pt has no retractions, no accessory muscle use, pt is a&o4, pt has crackles bilateral lower lobes, denies any fevers, chills, sick contacts, nausea, vomiting. Denies any chest pain, cough, dizziness.

## 2022-08-22 NOTE — H&P ADULT - PROBLEM SELECTOR PLAN 1
Patient with episode of rapid afib which broke spontaneously likely exacerbated from underlying infiltrative cardiomyopathy from Hemochromatosis.      Echocardiogram pending  Start Eliquis BID patient to go to [facility] office to get 30 day supply no insurance as from [place]  Metoprolol 25 BID   Monitor on tele for 24 hours if no events dc in AM

## 2022-08-22 NOTE — CONSULT NOTE ADULT - PROBLEM SELECTOR RECOMMENDATION 9
Atrial fib, patient asymptomatic with rapid ventricular response  - [id number]= 4  - start Heparin gtt with bolus if no contraindications, and follow with Heparin continuos infusion, keep PTT 50-80sec, monitor daily CBC and Plt count  - Tele monitor, check CBC, CMP, TFTs, trend CE x3, ECG and CXR  - will transition to NOAC before discharge once non valvular and renal etiologies ruled out  - start Cardizem 30mg oral every 6 hours for rate control and Lopressor 5mg IVP every 6 hours as needed for HR>120  - awaiting TTE in AM  - AF is a risk factor for cognitive impairment and dementia and mortality. Physical activity and higher cardiorespiratory fitness may protect against mortality in AF.

## 2022-08-22 NOTE — ED PROVIDER NOTE - WET READ LAUNCH FT
There are no Wet Read(s) to document. There is 1 Wet Read(s) to document. Adjacent Tissue Transfer Text: The defect edges were debeveled with a #15 scalpel blade.  Given the location of the defect and the proximity to free margins an adjacent tissue transfer was deemed most appropriate.  Using a sterile surgical marker, an appropriate flap was drawn incorporating the defect and placing the expected incisions within the relaxed skin tension lines where possible.    The area thus outlined was incised deep to adipose tissue with a #15 scalpel blade.  The skin margins were undermined to an appropriate distance in all directions utilizing iris scissors.

## 2022-08-22 NOTE — CONSULT NOTE ADULT - NS ATTEND AMEND GEN_ALL_CORE FT
Chronic low sodium. atrial fibrillation with rapid ventricular rate .    Prior history of diastolic dyfucntiona dn hemochrromatosis of the heart.  she is not on phelbotomy as she also has MDS and anemia.   she is from [facility] and has a phsyicians in [facility]. atrial fibrillation is new thought  will get echo.   and now that she is back on sinus. and since sodium although low is at her baseline and she is on sodium tablets.   after echo , and if no significant red flags, she could be discharge home with sandra mcbride with her physicians.    we will provide her with anyi on dicharge  samples form our office so that she can go to [facility] and  her meds.  she is ON ARb. and we will starte her on beta-blocker

## 2022-08-22 NOTE — ED ADULT NURSE REASSESSMENT NOTE - NS ED NURSE REASSESS COMMENT FT1
assumed care of pt from night rn, pt came in for fast heart beat, pt denies palpitations or chest pain at this time, neg. SOB, pt on heparin drip on pump, pt on the monitor,

## 2022-08-22 NOTE — ED PROVIDER NOTE - PHYSICAL EXAMINATION
General: nontoxic appearing in no acute distress. Alert and cooperative.   Head: Normocephalic, atraumatic.  Eyes: PERRLA. No conjunctival injection. No scleral icterus. EOMI  ENMT: Atraumatic external nose and ears. Moist mucous membranes. Oropharynx clear.  Neck: Soft and supple. Full ROM without pain.   Cardiac: tachycardic rate and irregular rhythm. No murmurs. Peripheral pulses 2+ and symmetric in all extremities. No LE edema.  Resp: Unlabored respiratory effort. faint b/l crackles.   Abd: Soft, non-tender, non-distended.   MSK: Spine midline and non-tender.   Skin: Warm and dry.   Neuro: AO x 3. Moves all extremities symmetrically. Motor strength and sensation grossly intact.

## 2022-08-22 NOTE — H&P ADULT - ASSESSMENT
81 yr old F w/a PMH of MDS, Hemochromatosis, Non ischemic CM, HTN, gout, DM presents new onset of Afib.

## 2022-08-22 NOTE — CONSULT NOTE ADULT - PROBLEM SELECTOR RECOMMENDATION 3
Admit to monitored settings, check CBC, BMP, Trend Ce x3, pro-BNP   - not in acute distress,   - Strict I/O and daily standing weights (if possible), low Na diet  - trend daily BMP, keep K > 4, Mg > 2    - Monitor renal function if with ongoing diuresis   - CXR w/o edema/congestion  - scheduled for TTE this morning    case d/w Dr. Cobb

## 2022-08-22 NOTE — ED PROVIDER NOTE - CLINICAL SUMMARY MEDICAL DECISION MAKING FREE TEXT BOX
81y Female with history of CHF, HTN, DM, myeloproliferative disorder presenting with palpitations noted to be in afib rvr. No chest pain, shortness of breath. Hemodynamically stable. Cardizem, labs, cxr, reassess.

## 2022-08-22 NOTE — H&P ADULT - NSICDXFAMILYHX_GEN_ALL_CORE_FT
FAMILY HISTORY:  Sibling  Still living? Unknown  No pertinent family history, Age at diagnosis: Age Unknown

## 2022-08-22 NOTE — H&P ADULT - NSICDXPASTMEDICALHX_GEN_ALL_CORE_FT
PAST MEDICAL HISTORY:  DM (diabetes mellitus)     HTN (hypertension)     Myeloproliferative disorder

## 2022-08-22 NOTE — CONSULT NOTE ADULT - ASSESSMENT
9yo F with MDS, non ischemic CM, HTN and T2DM admitted after experiencing acute onset palpitations, dyspnea, fatigue and vague chest pain due to new onset A fib w/ RVR. 7yo F with MDS, non ischemic CM, HTN and T2DM, admitted after experiencing acute onset palpitations, dyspnea, fatigue and vague chest pain due to new onset A fib w/ RVR.

## 2022-08-23 VITALS
OXYGEN SATURATION: 95 % | DIASTOLIC BLOOD PRESSURE: 65 MMHG | RESPIRATION RATE: 16 BRPM | TEMPERATURE: 98 F | HEART RATE: 72 BPM | SYSTOLIC BLOOD PRESSURE: 116 MMHG

## 2022-08-23 LAB
CULTURE RESULTS: SIGNIFICANT CHANGE UP
SPECIMEN SOURCE: SIGNIFICANT CHANGE UP

## 2022-08-23 PROCEDURE — 0225U NFCT DS DNA&RNA 21 SARSCOV2: CPT

## 2022-08-23 PROCEDURE — 93306 TTE W/DOPPLER COMPLETE: CPT

## 2022-08-23 PROCEDURE — 85610 PROTHROMBIN TIME: CPT

## 2022-08-23 PROCEDURE — 87040 BLOOD CULTURE FOR BACTERIA: CPT

## 2022-08-23 PROCEDURE — 71045 X-RAY EXAM CHEST 1 VIEW: CPT

## 2022-08-23 PROCEDURE — 96374 THER/PROPH/DIAG INJ IV PUSH: CPT

## 2022-08-23 PROCEDURE — 84443 ASSAY THYROID STIM HORMONE: CPT

## 2022-08-23 PROCEDURE — 85730 THROMBOPLASTIN TIME PARTIAL: CPT

## 2022-08-23 PROCEDURE — 87086 URINE CULTURE/COLONY COUNT: CPT

## 2022-08-23 PROCEDURE — 36415 COLL VENOUS BLD VENIPUNCTURE: CPT

## 2022-08-23 PROCEDURE — 83880 ASSAY OF NATRIURETIC PEPTIDE: CPT

## 2022-08-23 PROCEDURE — 84484 ASSAY OF TROPONIN QUANT: CPT

## 2022-08-23 PROCEDURE — 80053 COMPREHEN METABOLIC PANEL: CPT

## 2022-08-23 PROCEDURE — 84436 ASSAY OF TOTAL THYROXINE: CPT

## 2022-08-23 PROCEDURE — 93005 ELECTROCARDIOGRAM TRACING: CPT

## 2022-08-23 PROCEDURE — 85025 COMPLETE CBC W/AUTO DIFF WBC: CPT

## 2022-08-23 PROCEDURE — 84480 ASSAY TRIIODOTHYRONINE (T3): CPT

## 2022-08-23 PROCEDURE — 83735 ASSAY OF MAGNESIUM: CPT

## 2022-08-23 PROCEDURE — 99239 HOSP IP/OBS DSCHRG MGMT >30: CPT

## 2022-08-23 PROCEDURE — 99285 EMERGENCY DEPT VISIT HI MDM: CPT | Mod: 25

## 2022-08-23 PROCEDURE — 81001 URINALYSIS AUTO W/SCOPE: CPT

## 2022-08-23 RX ORDER — APIXABAN 2.5 MG/1
1 TABLET, FILM COATED ORAL
Qty: 60 | Refills: 0
Start: 2022-08-23 | End: 2022-09-21

## 2022-08-23 RX ORDER — IRBESARTAN AND HYDROCHLOROTHIAZIDE 12.5; 3 MG/1; MG/1
1 TABLET ORAL
Qty: 0 | Refills: 0 | DISCHARGE

## 2022-08-23 RX ORDER — METOPROLOL TARTRATE 50 MG
1 TABLET ORAL
Qty: 60 | Refills: 0
Start: 2022-08-23 | End: 2022-09-21

## 2022-08-23 RX ORDER — LOSARTAN POTASSIUM 100 MG/1
1 TABLET, FILM COATED ORAL
Qty: 30 | Refills: 0
Start: 2022-08-23 | End: 2022-09-21

## 2022-08-23 RX ADMIN — Medication 25 MILLIGRAM(S): at 06:07

## 2022-08-23 RX ADMIN — ATORVASTATIN CALCIUM 40 MILLIGRAM(S): 80 TABLET, FILM COATED ORAL at 06:09

## 2022-08-23 RX ADMIN — SODIUM CHLORIDE 1 GRAM(S): 9 INJECTION INTRAMUSCULAR; INTRAVENOUS; SUBCUTANEOUS at 06:06

## 2022-08-23 RX ADMIN — APIXABAN 2.5 MILLIGRAM(S): 2.5 TABLET, FILM COATED ORAL at 06:10

## 2022-08-23 NOTE — DISCHARGE NOTE NURSING/CASE MANAGEMENT/SOCIAL WORK - PATIENT PORTAL LINK FT
You can access the FollowMyHealth Patient Portal offered by [facility] by registering at the following website: [email or web address]. By joining [employer]’s FollowMyHealth portal, you will also be able to view your health information using other applications (apps) compatible with our system.

## 2022-08-23 NOTE — DISCHARGE NOTE PROVIDER - HOSPITAL COURSE
81 yr old F w/a PMH of MDS, Hemochromatosis, Non ischemic CM, HTN, gout, DM presents new onset of Afib.    She was started on Metoprolol and Apixaban reverted to NSR and has remained so since admission.   Medically stable for discharge

## 2022-08-23 NOTE — DISCHARGE NOTE NURSING/CASE MANAGEMENT/SOCIAL WORK - NSDCPEFALRISK_GEN_ALL_CORE
For information on Fall & Injury Prevention, visit: [email or web address] [place]  [email or web address] [place]  https://www.cdc.gov/steadi/patient.html

## 2022-08-23 NOTE — DISCHARGE NOTE PROVIDER - NSDCMRMEDTOKEN_GEN_ALL_CORE_FT
allopurinol 100 mg oral tablet: 1 tab(s) orally once a day  apixaban 2.5 mg oral tablet: 1 tab(s) orally 2 times a day  Cozaar 25 mg oral tablet: 1 tab(s) orally once a day  Crestor 10 mg oral tablet: 1 tab(s) orally once a day  folic acid 1 mg oral tablet: 1 tab(s) orally once a day  Jadenu: 960 milligram(s) orally once a day  Lasix 20 mg oral tablet: 1 tab(s) orally once a day  metFORMIN 500 mg oral tablet, extended release: 1 tab(s) orally once a day  metoprolol tartrate 25 mg oral tablet: 1 tab(s) orally 2 times a day  Mrs Karyn Cullen was admitted to [facility] on 8/23/22 for Atiral Fibrillation with RVR. She has been in normal sinus rhythm since then and her medications have been adjusted and she may resume her travel upon discharge on 8/23/22:   Sodium Chloride 1 g oral tablet: 1 tab(s) orally 2 times a day

## 2022-08-23 NOTE — DISCHARGE NOTE PROVIDER - ATTENDING DISCHARGE PHYSICAL EXAMINATION:
OBJECTIVE:  Vital Signs Last 24 Hrs  T(C): 36.4 (23 Aug 2022 07:45), Max: 37 (22 Aug 2022 19:26)  T(F): 97.6 (23 Aug 2022 07:45), Max: 98.6 (22 Aug 2022 19:26)  HR: 72 (23 Aug 2022 07:45) (71 - 84)  BP: 116/65 (23 Aug 2022 07:45) (103/63 - 123/69)  BP(mean): --  RR: 16 (23 Aug 2022 07:45) (15 - 16)  SpO2: 95% (23 Aug 2022 07:45) (92% - 97%)    Parameters below as of 23 Aug 2022 07:45  Patient On (Oxygen Delivery Method): room air        PHYSICAL EXAMINATION  General: Elderly female lying in bed, NAD  HEENT:  EOMI  NECK:  Supple  CVS: regular rate and rhythm S1 S2, Trace pedal edema  RESP:  CTAB  GI:  Soft nondistended nontender BS+  : No suprapubic tenderness  MSK:  FROM  CNS:  No gross focal or global deficit appreciated  INTEG:  warm dry skin  PSYCH:  Fair mood

## 2022-08-23 NOTE — DISCHARGE NOTE PROVIDER - CARE PROVIDER_API CALL
Irina Cobb)  Cardiology; Internal Medicine  [address] [place] [address], [address] [id number]  Phone: [phone]  Fax: [phone]  Follow Up Time:

## 2022-08-23 NOTE — DISCHARGE NOTE PROVIDER - NSDCCPCAREPLAN_GEN_ALL_CORE_FT
PRINCIPAL DISCHARGE DIAGNOSIS  Diagnosis: Atrial fibrillation  Assessment and Plan of Treatment: ROBER VASC 4  Continue medications as prescribed  Follow up with Cardiology      SECONDARY DISCHARGE DIAGNOSES  Diagnosis: Chronic hyponatremia  Assessment and Plan of Treatment: Cotinue salt tablets    Diagnosis: Diastolic dysfunction with chronic heart failure  Assessment and Plan of Treatment: Fliud restricton 1200ml/day  Dicontinue HCTZ  Cotinue Furosemide  daily    Diagnosis: Hemochromatosis  Assessment and Plan of Treatment: Resume home medications    Diagnosis: T2DM (type 2 diabetes mellitus)  Assessment and Plan of Treatment: Resume home medications    Diagnosis: HTN (hypertension)  Assessment and Plan of Treatment: DIscontinue Irbesartan and HCTZ  Losartan daily    Diagnosis: Gout  Assessment and Plan of Treatment: Continue home medications    Diagnosis: Stage 3 chronic kidney disease  Assessment and Plan of Treatment: Followu up with MD

## 2022-08-27 LAB
CULTURE RESULTS: SIGNIFICANT CHANGE UP
CULTURE RESULTS: SIGNIFICANT CHANGE UP
SPECIMEN SOURCE: SIGNIFICANT CHANGE UP
SPECIMEN SOURCE: SIGNIFICANT CHANGE UP

## 2025-06-10 NOTE — ED CDU PROVIDER INITIAL DAY NOTE - PMH
DM (diabetes mellitus)    HTN (hypertension)    Myeloproliferative disorder CT scan shows no acute findings advised follow-up with orthospine PCP return precautions given patient feeling better ambulatory in ED